# Patient Record
Sex: MALE
[De-identification: names, ages, dates, MRNs, and addresses within clinical notes are randomized per-mention and may not be internally consistent; named-entity substitution may affect disease eponyms.]

---

## 2020-06-04 ENCOUNTER — HOSPITAL ENCOUNTER (EMERGENCY)
Dept: HOSPITAL 5 - ED | Age: 25
Discharge: HOME | End: 2020-06-04
Payer: MEDICAID

## 2020-06-04 VITALS — DIASTOLIC BLOOD PRESSURE: 88 MMHG | SYSTOLIC BLOOD PRESSURE: 132 MMHG

## 2020-06-04 DIAGNOSIS — F31.9: Primary | ICD-10-CM

## 2020-06-04 DIAGNOSIS — Z86.59: ICD-10-CM

## 2020-06-04 PROCEDURE — 99283 EMERGENCY DEPT VISIT LOW MDM: CPT

## 2020-06-04 NOTE — EMERGENCY DEPARTMENT REPORT
ED Psych HPI





- General


Chief Complaint: Psych


Stated Complaint: PYSCH EVALUATION


Time Seen by Provider: 06/04/20 12:34


Source: EMS


Mode of arrival: Stretcher





- History of Present Illness


Initial Comments: 


25-year-old male states, "I just need to have my medicines changed".  This is a 

patient coming from a group home with a bipolar disorder and "intermittent 

explosive disorder".  He states that he was losing his temper today.  However he

denies any property or personal injury.  He stated that he did not want to hurt 

himself or others.  He thinks that his Risperdal is not agreeing with him.  I 

mentioned Seroquel to him.  He stated that he thinks that that medicine might 

work for him because he heard it works well for others.  He states he has not 

been on this medicine prior.  He does have a local PMD and psychiatrist.  He 

presents with his medication administration record.  The patient states that he 

did get his morning medicines.  He does not report any precipitating event.  He 

does not state that he is dissatisfied with his living situation.  He has not 

suffering from hallucinosis or delusional thinking.  He is cooperative with this

provider on my encounter.


MD Complaint: other


Associated Psychiatric Symptoms: none


History of same: Yes


Quality: intermittent


Improves With: none


Worsens With: none


Associated Symptoms: denies other symptoms


Treatments Prior to Arrival: none





ED Review of Systems


ROS: 


Stated complaint: PYSCH EVALUATION


Other details as noted in HPI





Constitutional: denies: chills, fever


Eyes: denies: eye pain, eye discharge, vision change


ENT: denies: ear pain, throat pain


Respiratory: denies: cough, shortness of breath, wheezing


Cardiovascular: denies: chest pain, palpitations


Endocrine: no symptoms reported


Gastrointestinal: denies: abdominal pain, nausea, diarrhea


Genitourinary: denies: urgency, dysuria


Musculoskeletal: denies: back pain, joint swelling, arthralgia


Skin: denies: rash, lesions


Neurological: denies: headache, weakness, paresthesias


Psychiatric: as per HPI.  denies: anxiety, depression, auditory hallucinations, 

visual hallucinations, homicidal thoughts, suicidal thoughts


Hematological/Lymphatic: denies: easy bleeding, easy bruising





ED Past Medical Hx





- Past Medical History


Hx Psychiatric Treatment: Yes





- Social History


Substance Use Type: None





ED Physical Exam





- General


Limitations: No Limitations


General appearance: alert, in no apparent distress





- Head


Head exam: Present: atraumatic, normocephalic





- Eye


Eye exam: Present: other (O.  S.  Aphakic)





- ENT


ENT exam: Present: normal exam, mucous membranes moist





- Neck


Neck exam: Present: normal inspection.  Absent: tenderness, meningismus





- Respiratory


Respiratory exam: Present: normal lung sounds bilaterally.  Absent: respiratory 

distress





- Cardiovascular


Cardiovascular Exam: Present: regular rate, normal rhythm.  Absent: systolic 

murmur, diastolic murmur, rubs, gallop





- GI/Abdominal


GI/Abdominal exam: Present: soft, normal bowel sounds.  Absent: distended, 

tenderness, guarding, rebound





- Rectal


Rectal exam: Present: deferred





- Extremities Exam


Extremities exam: Present: normal inspection





- Back Exam


Back exam: Present: normal inspection





- Neurological Exam


Neurological exam: Present: alert, oriented X3, CN II-XII intact.  Absent: motor

sensory deficit





- Psychiatric


Psychiatric exam: Present: normal mood, flat affect





- Skin


Skin exam: Present: warm, dry, intact, normal color.  Absent: rash





ED Course


                                   Vital Signs











  06/04/20





  12:26


 


Temperature 97.8 F


 


Pulse Rate 74


 


Respiratory 18





Rate 


 


Blood Pressure 132/72





[Right] 


 


O2 Sat by Pulse 100





Oximetry 














- Reevaluation(s)


Reevaluation #1: 


I have asked the nursing staff to verify that no untoward behavior occurred in 

the group home.  I do not think it is a problem with stopping his Respinol and 

beginning Seroquel.  Unless there is further information to the contrary, the 

patient will be prescribed Seroquel and return to his group home setting to 

follow-up with his psychiatric provider.


06/04/20 12:54





Critical care attestation.: 


If time is entered above; I have spent that time in minutes in the direct care 

of this critically ill patient, excluding procedure time.








ED Disposition


Clinical Impression: 


 History of impulsive behavior





Bipolar disorder


Qualifiers:


 Active/Remission status: currently active Current bipolar episode type: manic 

Current episode severity: moderate Qualified Code(s): F31.12 - Bipolar disorder,

 current episode manic without psychotic features, moderate





Disposition: DC-01 TO HOME OR SELFCARE


Is pt being admited?: No


Does the pt Need Aspirin: No


Condition: Stable


Instructions:  Bipolar Disorder (ED)


Additional Instructions: 


May give 2 mg of Risperdal instead of 1 in the morning from now on.  Consult 

with the patient's psychiatrist as soon as possible regarding follow-up care and

 further medication adjustments.


Referrals: 


PRIMARY CARE,MD [Primary Care Provider] - 3-5 Days


Time of Disposition: 13:18